# Patient Record
Sex: MALE | Race: WHITE | HISPANIC OR LATINO | URBAN - METROPOLITAN AREA
[De-identification: names, ages, dates, MRNs, and addresses within clinical notes are randomized per-mention and may not be internally consistent; named-entity substitution may affect disease eponyms.]

---

## 2020-02-03 ENCOUNTER — EMERGENCY (EMERGENCY)
Facility: HOSPITAL | Age: 50
LOS: 1 days | Discharge: HOME | End: 2020-02-03
Admitting: EMERGENCY MEDICINE
Payer: COMMERCIAL

## 2020-02-03 VITALS
DIASTOLIC BLOOD PRESSURE: 81 MMHG | HEART RATE: 61 BPM | SYSTOLIC BLOOD PRESSURE: 122 MMHG | OXYGEN SATURATION: 98 % | RESPIRATION RATE: 18 BRPM | TEMPERATURE: 97 F

## 2020-02-03 VITALS
RESPIRATION RATE: 18 BRPM | SYSTOLIC BLOOD PRESSURE: 131 MMHG | OXYGEN SATURATION: 98 % | TEMPERATURE: 98 F | HEART RATE: 78 BPM | DIASTOLIC BLOOD PRESSURE: 79 MMHG | WEIGHT: 164.91 LBS | HEIGHT: 65 IN

## 2020-02-03 DIAGNOSIS — M54.5 LOW BACK PAIN: ICD-10-CM

## 2020-02-03 LAB
APPEARANCE UR: CLEAR — SIGNIFICANT CHANGE UP
BILIRUB UR-MCNC: NEGATIVE — SIGNIFICANT CHANGE UP
COLOR SPEC: YELLOW — SIGNIFICANT CHANGE UP
DIFF PNL FLD: NEGATIVE — SIGNIFICANT CHANGE UP
GLUCOSE UR QL: NEGATIVE — SIGNIFICANT CHANGE UP
KETONES UR-MCNC: NEGATIVE — SIGNIFICANT CHANGE UP
LEUKOCYTE ESTERASE UR-ACNC: NEGATIVE — SIGNIFICANT CHANGE UP
NITRITE UR-MCNC: NEGATIVE — SIGNIFICANT CHANGE UP
PH UR: 6 — SIGNIFICANT CHANGE UP (ref 5–8)
PROT UR-MCNC: NEGATIVE MG/DL — SIGNIFICANT CHANGE UP
SP GR SPEC: >=1.03 — SIGNIFICANT CHANGE UP (ref 1–1.03)
UROBILINOGEN FLD QL: 0.2 E.U./DL — SIGNIFICANT CHANGE UP

## 2020-02-03 PROCEDURE — 74176 CT ABD & PELVIS W/O CONTRAST: CPT | Mod: 26

## 2020-02-03 PROCEDURE — 72131 CT LUMBAR SPINE W/O DYE: CPT | Mod: 26

## 2020-02-03 PROCEDURE — 72131 CT LUMBAR SPINE W/O DYE: CPT

## 2020-02-03 PROCEDURE — 96372 THER/PROPH/DIAG INJ SC/IM: CPT

## 2020-02-03 PROCEDURE — 81003 URINALYSIS AUTO W/O SCOPE: CPT

## 2020-02-03 PROCEDURE — 99285 EMERGENCY DEPT VISIT HI MDM: CPT

## 2020-02-03 PROCEDURE — 74176 CT ABD & PELVIS W/O CONTRAST: CPT

## 2020-02-03 PROCEDURE — 99284 EMERGENCY DEPT VISIT MOD MDM: CPT | Mod: 25

## 2020-02-03 RX ORDER — TRAMADOL HYDROCHLORIDE 50 MG/1
50 TABLET ORAL ONCE
Refills: 0 | Status: DISCONTINUED | OUTPATIENT
Start: 2020-02-03 | End: 2020-02-03

## 2020-02-03 RX ORDER — METHOCARBAMOL 500 MG/1
2 TABLET, FILM COATED ORAL
Qty: 18 | Refills: 0
Start: 2020-02-03 | End: 2020-02-05

## 2020-02-03 RX ORDER — TRAMADOL HYDROCHLORIDE 50 MG/1
1 TABLET ORAL
Qty: 9 | Refills: 0
Start: 2020-02-03 | End: 2020-02-05

## 2020-02-03 RX ORDER — LIDOCAINE 4 G/100G
1 CREAM TOPICAL ONCE
Refills: 0 | Status: COMPLETED | OUTPATIENT
Start: 2020-02-03 | End: 2020-02-03

## 2020-02-03 RX ORDER — DIAZEPAM 5 MG
10 TABLET ORAL ONCE
Refills: 0 | Status: DISCONTINUED | OUTPATIENT
Start: 2020-02-03 | End: 2020-02-03

## 2020-02-03 RX ORDER — KETOROLAC TROMETHAMINE 30 MG/ML
60 SYRINGE (ML) INJECTION ONCE
Refills: 0 | Status: DISCONTINUED | OUTPATIENT
Start: 2020-02-03 | End: 2020-02-03

## 2020-02-03 RX ADMIN — Medication 60 MILLIGRAM(S): at 12:12

## 2020-02-03 RX ADMIN — Medication 60 MILLIGRAM(S): at 10:04

## 2020-02-03 RX ADMIN — LIDOCAINE 1 PATCH: 4 CREAM TOPICAL at 15:47

## 2020-02-03 RX ADMIN — TRAMADOL HYDROCHLORIDE 50 MILLIGRAM(S): 50 TABLET ORAL at 15:48

## 2020-02-03 RX ADMIN — Medication 10 MILLIGRAM(S): at 10:04

## 2020-02-03 NOTE — ED PROVIDER NOTE - CLINICAL SUMMARY MEDICAL DECISION MAKING FREE TEXT BOX
50 y/o M no PMHx p/w R lower back pain s/p pushing heavy cart. No N/T/W, incontinence. Pt ambulatory in ED. Tenderness to R upper lumbar paraspinal muscles. No midline tenderness. Pain reproducible w/ movements. Lower extremity strength intact. Sensation intact. Likely MSK pain. Will try Toradol and Valium. If pain persists, will refer to PMD for outpatient MRI and possible physical therapy.

## 2020-02-03 NOTE — ED ADULT NURSE NOTE - OBJECTIVE STATEMENT
pt is [employer] employee, c/o of lower back pain for two weeks after he hurt himself while working. pt denies other injury or pain, no radiation of the pain to the other part of his body. pt appears uncomfortable.

## 2020-02-03 NOTE — ED ADULT TRIAGE NOTE - OTHER COMPLAINTS
patient c/o R lower back pain x 2 weeks s/p pushing a 450 pound linen cart--denies urinary/bowel incontinence--ambulatory into triage

## 2020-02-03 NOTE — ED PROVIDER NOTE - PATIENT PORTAL LINK FT
You can access the FollowMyHealth Patient Portal offered by [facility] by registering at the following website: [email or web address]. By joining [employer]’s FollowMyHealth portal, you will also be able to view your health information using other applications (apps) compatible with our system.

## 2020-02-03 NOTE — ED ADULT NURSE NOTE - NSIMPLEMENTINTERV_GEN_ALL_ED
Implemented All Universal Safety Interventions:  [place] to call system. Call bell, personal items and telephone within reach. Instruct patient to call for assistance. Room bathroom lighting operational. Non-slip footwear when patient is off stretcher. Physically safe environment: no spills, clutter or unnecessary equipment. Stretcher in lowest position, wheels locked, appropriate side rails in place.

## 2020-02-03 NOTE — ED PROVIDER NOTE - PHYSICAL EXAMINATION
CONSTITUTIONAL: Well-appearing; well-nourished; in no apparent distress.   HEAD: Normocephalic; atraumatic.   EYES: PERRL; EOM intact; conjunctiva and sclera clear  ENT: normal nose; no rhinorrhea; normal pharynx with no erythema or lesions.   NECK: Supple; non-tender; no LAD  BACK: Tenderness to R upper lumbar paraspinal muscles. No midline tenderness. Pain reproducible w/ movements. Lower extremity strength intact. Sensation intact.   CARDIOVASCULAR: Normal S1, S2; no murmurs, rubs, or gallops. Regular rate and rhythm.   RESPIRATORY: Breathing easily; breath sounds clear and equal bilaterally; no wheezes, rhonchi, or rales.  GI: Soft; non-distended; non-tender; no palpable organomegaly.   MSK: FROM at all extremities, normal tone   EXT: No cyanosis or edema; N/V intact  SKIN: Normal for age and race; warm; dry; good turgor; no apparent lesions or rash.   NEURO: A & O x 3; face symmetric; grossly unremarkable.   PSYCHOLOGICAL: The patient’s mood and manner are appropriate.

## 2020-02-03 NOTE — ED PROVIDER NOTE - OBJECTIVE STATEMENT
48 y/o M [employer] employee no PMHx presents to the ED with c/o right lower back pain x 2 W s/p trying to avoid running into baby while pushing a 450lb linen truck. He initially felt a muscle pull that was not too painful, but it gradually worsened. He went to his PMD Dr. Grey 2 D after the initial incident and was given anti-inflammatories and a muscle relaxer w/ no relief. The pain is worsened w/ movement. He currently feels a slight pinch in his back that makes him "gasp for air." Denies leg/groin pain, leg N/T/W, bloody urination, abdominal pain and incontinence. Pt is able to ambulate. He feels some burning during urination. 50 y/o M [employer] employee no PMHx presents to the ED with c/o right lower back pain x 2 W s/p trying to avoid running into baby while pushing a 450lb linen truck. He initially felt a muscle pull that was not too painful, but it gradually worsened. He went to his PMD Dr. Marcelino 2 D after the initial incident and was given anti-inflammatories and a muscle relaxer w/ no relief. The pain is worsened w/ movement. He currently feels a slight pinch in his back that makes him "gasp for air." Denies leg/groin pain, leg N/T/W, bloody urination, abdominal pain and incontinence. Pt is able to ambulate. He feels some burning during urination.

## 2020-04-26 ENCOUNTER — MESSAGE (OUTPATIENT)
Age: 50
End: 2020-04-26

## 2020-05-07 LAB
SARS-COV-2 IGG SERPL IA-ACNC: 0 INDEX
SARS-COV-2 IGG SERPL QL IA: NEGATIVE

## 2020-08-28 ENCOUNTER — EMERGENCY (EMERGENCY)
Facility: HOSPITAL | Age: 50
LOS: 1 days | Discharge: HOME | End: 2020-08-28
Attending: EMERGENCY MEDICINE | Admitting: EMERGENCY MEDICINE
Payer: COMMERCIAL

## 2020-08-28 VITALS
OXYGEN SATURATION: 97 % | DIASTOLIC BLOOD PRESSURE: 82 MMHG | WEIGHT: 164.91 LBS | SYSTOLIC BLOOD PRESSURE: 141 MMHG | RESPIRATION RATE: 16 BRPM | HEART RATE: 90 BPM | TEMPERATURE: 99 F

## 2020-08-28 LAB — SARS-COV-2 RNA SPEC QL NAA+PROBE: SIGNIFICANT CHANGE UP

## 2020-08-28 PROCEDURE — 99283 EMERGENCY DEPT VISIT LOW MDM: CPT

## 2020-08-28 PROCEDURE — U0003: CPT

## 2020-08-28 NOTE — ED PROVIDER NOTE - ATTENDING CONTRIBUTION TO CARE
49 M works here requests covid test b/c coworker was +  vss  s1s2 lungs cta bl  abd soft nt nd +bs  no sx  IMP- COVID test
18

## 2020-08-28 NOTE — ED PROVIDER NOTE - PATIENT PORTAL LINK FT
You can access the FollowMyHealth Patient Portal offered by [facility] by registering at the following website: [email or web address]. By joining [employer]’s FollowMyHealth portal, you will also be able to view your health information using other applications (apps) compatible with our system.

## 2020-08-28 NOTE — ED PROVIDER NOTE - CLINICAL SUMMARY MEDICAL DECISION MAKING FREE TEXT BOX
48 y/o m presents for covid test after a co-worker tested positive this past week who he had been around; pt asymptomatic, vss, exam unremarkable, test performed, will d/c, f/u result.

## 2020-08-28 NOTE — ED PROVIDER NOTE - OBJECTIVE STATEMENT
50 y/o m employee at [employer] presents stating a co worker recently tested positive for covid and has been sent home to quarantine.  Pt reports working closely with this person and wants to be tested.  Pt has no sx.  Denies fever, chills, cough, sob, all other ROS negative.

## 2020-08-31 ENCOUNTER — TRANSCRIPTION ENCOUNTER (OUTPATIENT)
Age: 50
End: 2020-08-31

## 2020-09-01 ENCOUNTER — TRANSCRIPTION ENCOUNTER (OUTPATIENT)
Age: 50
End: 2020-09-01

## 2020-09-01 DIAGNOSIS — Z20.828 CONTACT WITH AND (SUSPECTED) EXPOSURE TO OTHER VIRAL COMMUNICABLE DISEASES: ICD-10-CM

## 2020-09-02 ENCOUNTER — TRANSCRIPTION ENCOUNTER (OUTPATIENT)
Age: 50
End: 2020-09-02

## 2020-11-20 ENCOUNTER — EMERGENCY (EMERGENCY)
Facility: HOSPITAL | Age: 50
LOS: 1 days | Discharge: HOME | End: 2020-11-20
Attending: EMERGENCY MEDICINE | Admitting: EMERGENCY MEDICINE
Payer: COMMERCIAL

## 2020-11-20 VITALS
WEIGHT: 164.91 LBS | SYSTOLIC BLOOD PRESSURE: 117 MMHG | TEMPERATURE: 98 F | HEART RATE: 90 BPM | RESPIRATION RATE: 16 BRPM | DIASTOLIC BLOOD PRESSURE: 73 MMHG | HEIGHT: 65 IN | OXYGEN SATURATION: 97 %

## 2020-11-20 VITALS
TEMPERATURE: 99 F | RESPIRATION RATE: 17 BRPM | OXYGEN SATURATION: 99 % | HEART RATE: 65 BPM | SYSTOLIC BLOOD PRESSURE: 108 MMHG | DIASTOLIC BLOOD PRESSURE: 67 MMHG

## 2020-11-20 DIAGNOSIS — R21 RASH AND OTHER NONSPECIFIC SKIN ERUPTION: ICD-10-CM

## 2020-11-20 DIAGNOSIS — T78.40XA ALLERGY, UNSPECIFIED, INITIAL ENCOUNTER: ICD-10-CM

## 2020-11-20 DIAGNOSIS — Z20.828 CONTACT WITH AND (SUSPECTED) EXPOSURE TO OTHER VIRAL COMMUNICABLE DISEASES: ICD-10-CM

## 2020-11-20 DIAGNOSIS — Z88.8 ALLERGY STATUS TO OTHER DRUGS, MEDICAMENTS AND BIOLOGICAL SUBSTANCES: ICD-10-CM

## 2020-11-20 LAB
ALBUMIN SERPL ELPH-MCNC: 4.2 G/DL — SIGNIFICANT CHANGE UP (ref 3.3–5)
ALP SERPL-CCNC: 79 U/L — SIGNIFICANT CHANGE UP (ref 40–120)
ALT FLD-CCNC: 25 U/L — SIGNIFICANT CHANGE UP (ref 10–45)
ANION GAP SERPL CALC-SCNC: 12 MMOL/L — SIGNIFICANT CHANGE UP (ref 5–17)
AST SERPL-CCNC: 22 U/L — SIGNIFICANT CHANGE UP (ref 10–40)
BASOPHILS # BLD AUTO: 0.02 K/UL — SIGNIFICANT CHANGE UP (ref 0–0.2)
BASOPHILS NFR BLD AUTO: 0.2 % — SIGNIFICANT CHANGE UP (ref 0–2)
BILIRUB SERPL-MCNC: 0.4 MG/DL — SIGNIFICANT CHANGE UP (ref 0.2–1.2)
BUN SERPL-MCNC: 19 MG/DL — SIGNIFICANT CHANGE UP (ref 7–23)
CALCIUM SERPL-MCNC: 9.5 MG/DL — SIGNIFICANT CHANGE UP (ref 8.4–10.5)
CHLORIDE SERPL-SCNC: 103 MMOL/L — SIGNIFICANT CHANGE UP (ref 96–108)
CO2 SERPL-SCNC: 21 MMOL/L — LOW (ref 22–31)
CREAT SERPL-MCNC: 0.9 MG/DL — SIGNIFICANT CHANGE UP (ref 0.5–1.3)
EOSINOPHIL # BLD AUTO: 0.03 K/UL — SIGNIFICANT CHANGE UP (ref 0–0.5)
EOSINOPHIL NFR BLD AUTO: 0.3 % — SIGNIFICANT CHANGE UP (ref 0–6)
GLUCOSE SERPL-MCNC: 99 MG/DL — SIGNIFICANT CHANGE UP (ref 70–99)
HCT VFR BLD CALC: 49.8 % — SIGNIFICANT CHANGE UP (ref 39–50)
HGB BLD-MCNC: 16.5 G/DL — SIGNIFICANT CHANGE UP (ref 13–17)
IMM GRANULOCYTES NFR BLD AUTO: 0.4 % — SIGNIFICANT CHANGE UP (ref 0–1.5)
LYMPHOCYTES # BLD AUTO: 1.83 K/UL — SIGNIFICANT CHANGE UP (ref 1–3.3)
LYMPHOCYTES # BLD AUTO: 17.9 % — SIGNIFICANT CHANGE UP (ref 13–44)
MCHC RBC-ENTMCNC: 29.7 PG — SIGNIFICANT CHANGE UP (ref 27–34)
MCHC RBC-ENTMCNC: 33.1 GM/DL — SIGNIFICANT CHANGE UP (ref 32–36)
MCV RBC AUTO: 89.7 FL — SIGNIFICANT CHANGE UP (ref 80–100)
MONOCYTES # BLD AUTO: 0.33 K/UL — SIGNIFICANT CHANGE UP (ref 0–0.9)
MONOCYTES NFR BLD AUTO: 3.2 % — SIGNIFICANT CHANGE UP (ref 2–14)
NEUTROPHILS # BLD AUTO: 8 K/UL — HIGH (ref 1.8–7.4)
NEUTROPHILS NFR BLD AUTO: 78 % — HIGH (ref 43–77)
NRBC # BLD: 0 /100 WBCS — SIGNIFICANT CHANGE UP (ref 0–0)
PLATELET # BLD AUTO: 237 K/UL — SIGNIFICANT CHANGE UP (ref 150–400)
POTASSIUM SERPL-MCNC: 4.4 MMOL/L — SIGNIFICANT CHANGE UP (ref 3.5–5.3)
POTASSIUM SERPL-SCNC: 4.4 MMOL/L — SIGNIFICANT CHANGE UP (ref 3.5–5.3)
PROT SERPL-MCNC: 7.3 G/DL — SIGNIFICANT CHANGE UP (ref 6–8.3)
RBC # BLD: 5.55 M/UL — SIGNIFICANT CHANGE UP (ref 4.2–5.8)
RBC # FLD: 13.3 % — SIGNIFICANT CHANGE UP (ref 10.3–14.5)
SARS-COV-2 RNA SPEC QL NAA+PROBE: SIGNIFICANT CHANGE UP
SODIUM SERPL-SCNC: 136 MMOL/L — SIGNIFICANT CHANGE UP (ref 135–145)
WBC # BLD: 10.25 K/UL — SIGNIFICANT CHANGE UP (ref 3.8–10.5)
WBC # FLD AUTO: 10.25 K/UL — SIGNIFICANT CHANGE UP (ref 3.8–10.5)

## 2020-11-20 PROCEDURE — U0003: CPT

## 2020-11-20 PROCEDURE — 36415 COLL VENOUS BLD VENIPUNCTURE: CPT

## 2020-11-20 PROCEDURE — 96374 THER/PROPH/DIAG INJ IV PUSH: CPT

## 2020-11-20 PROCEDURE — 99284 EMERGENCY DEPT VISIT MOD MDM: CPT | Mod: 25

## 2020-11-20 PROCEDURE — 96375 TX/PRO/DX INJ NEW DRUG ADDON: CPT

## 2020-11-20 PROCEDURE — 80053 COMPREHEN METABOLIC PANEL: CPT

## 2020-11-20 PROCEDURE — 96361 HYDRATE IV INFUSION ADD-ON: CPT

## 2020-11-20 PROCEDURE — 99284 EMERGENCY DEPT VISIT MOD MDM: CPT

## 2020-11-20 PROCEDURE — 85025 COMPLETE CBC W/AUTO DIFF WBC: CPT

## 2020-11-20 RX ORDER — EPINEPHRINE 0.3 MG/.3ML
0.3 INJECTION INTRAMUSCULAR; SUBCUTANEOUS
Qty: 0.3 | Refills: 2
Start: 2020-11-20

## 2020-11-20 RX ORDER — DIPHENHYDRAMINE HCL 50 MG
50 CAPSULE ORAL ONCE
Refills: 0 | Status: COMPLETED | OUTPATIENT
Start: 2020-11-20 | End: 2020-11-20

## 2020-11-20 RX ORDER — SODIUM CHLORIDE 9 MG/ML
1000 INJECTION INTRAMUSCULAR; INTRAVENOUS; SUBCUTANEOUS ONCE
Refills: 0 | Status: COMPLETED | OUTPATIENT
Start: 2020-11-20 | End: 2020-11-20

## 2020-11-20 RX ORDER — DIPHENHYDRAMINE HCL 50 MG
2 CAPSULE ORAL
Qty: 20 | Refills: 0
Start: 2020-11-20

## 2020-11-20 RX ORDER — FAMOTIDINE 10 MG/ML
20 INJECTION INTRAVENOUS ONCE
Refills: 0 | Status: COMPLETED | OUTPATIENT
Start: 2020-11-20 | End: 2020-11-20

## 2020-11-20 RX ADMIN — Medication 50 MILLIGRAM(S): at 09:17

## 2020-11-20 RX ADMIN — Medication 125 MILLIGRAM(S): at 09:23

## 2020-11-20 RX ADMIN — SODIUM CHLORIDE 1000 MILLILITER(S): 9 INJECTION INTRAMUSCULAR; INTRAVENOUS; SUBCUTANEOUS at 10:34

## 2020-11-20 RX ADMIN — FAMOTIDINE 20 MILLIGRAM(S): 10 INJECTION INTRAVENOUS at 09:23

## 2020-11-20 RX ADMIN — SODIUM CHLORIDE 1000 MILLILITER(S): 9 INJECTION INTRAMUSCULAR; INTRAVENOUS; SUBCUTANEOUS at 09:22

## 2020-11-20 NOTE — ED PROVIDER NOTE - ATTENDING CONTRIBUTION TO CARE
48 yo M with PMH of anaphylaxis after taking Aleve and moxifloxacin p/w burning and itching rash since last night. Pt believes rash began after trying coworkers jerk chicken. Rash started in ears and face then spread to chest, shoulder and thighs overnight. Pt tried applying Benadryl cream with no relief. Currently endorses mild SOB, and nausea. No fevers, vomiting, CP, or diarrhea. Pt does not feel like tongue or throat is swollen and has no trouble breathing. Pt AAO, NAD, erythematous rash noted to torso/ arms and legs. No angioedema or intra-oral/ facial swelling noted. Ordered IVF benadryl, solumedrol and pepcid. Labs WNL. Pt with marked improvement of sxs post meds. Dcd with Rx for Prednisone and epi pen and advised to take Benadryl around the clock for the next 24 to 48 hours. Return precautions given.

## 2020-11-20 NOTE — ED ADULT TRIAGE NOTE - CHIEF COMPLAINT QUOTE
Pt CO generalized rash since yesterday.  Pt states "I had some pork teriyaki yesterday but I can't think of anything else that is setting my allergies off."  Speaking clearly, no labored breathing or facial swelling noted.

## 2020-11-20 NOTE — ED PROVIDER NOTE - PATIENT PORTAL LINK FT
You can access the FollowMyHealth Patient Portal offered by [facility] by registering at the following website: [email or web address]. By joining [employer]’s FollowMyHealth portal, you will also be able to view your health information using other applications (apps) compatible with our system.

## 2020-11-20 NOTE — ED PROVIDER NOTE - NSFOLLOWUPCLINICS_GEN_ALL_ED_FT
[facility]  Family Medicine  [address]  [place], [place] [address]  Phone: [phone]  Fax:   Follow Up Time: 4-6 Days

## 2020-11-20 NOTE — ED PROVIDER NOTE - ENMT NEGATIVE STATEMENT, MLM
Ears: no ear pain and no hearing problems. Nose: no nasal congestion and no nasal drainage. Mouth/Throat: no dysphagia, no hoarseness and no throat pain. N

## 2020-11-20 NOTE — ED ADULT NURSE NOTE - CHPI ED NUR SYMPTOMS NEG
no fever/no nausea/no tingling/no chills/no dizziness/no pain/no vomiting/no weakness/no decreased eating/drinking

## 2020-11-20 NOTE — ED PROVIDER NOTE - SKIN COLOR
Blanching erythematous and warm rash of face, ears, chest, shoulders and thighs with excoriation present. No wheals or hives

## 2020-11-20 NOTE — ED PROVIDER NOTE - CLINICAL SUMMARY MEDICAL DECISION MAKING FREE TEXT BOX
Pt is a 48 yo M with PMH of anaphylaxis after taking alleve and moxifloxacin (unsure if intubated) who p/w burning and itching rash due to unknown allergic reaction last night. Pt endorses mild SOB but no throat or tongue swelling. Mild GI symptoms of nausea, no vomiting or diarrhea. Pt likely not anaphylactic at this time. Will obtain labs. Ordered benadryl, solumedrol and pepcid.    Plan:  -IV fluids  -benadryl, solumedrol, pepcid  -f/u labs Pt is a 50 yo M with PMH of anaphylaxis after taking alleve and moxifloxacin (unsure if intubated) who p/w burning and itching rash due to unknown allergic reaction last night. Pt endorses mild SOB but no throat or tongue swelling. Mild GI symptoms of nausea, no vomiting or diarrhea. Pt likely not anaphylactic at this time. Will obtain labs. Ordered benadryl, solumedrol and pepcid.    Interval Progress Note:  Rash much improved after benadryl, solumedrol and pepcid. Pt experienced drowsiness with benadryl that is decreasing with rest. Itching has improved and now has mild itching. Pt stable and feels ready for discharged. Will be discharged with benadryl, prednisone and epi pen    Plan:  -IV fluids  -benadryl, solumedrol, pepcid  -f/u labs Pt is a 48 yo M with PMH of anaphylaxis after taking alleve and moxifloxacin (unsure if intubated) who p/w burning and itching rash due to unknown allergic reaction last night. Pt endorses mild SOB but no throat or tongue swelling. Mild GI symptoms of nausea, no vomiting or diarrhea. Pt likely not anaphylactic at this time. Will obtain labs. Ordered benadryl, solumedrol and pepcid.  Plan:  -IV fluids  -benadryl, solumedrol, pepcid  -f/u labs    Interval Progress Note:  Rash much improved after benadryl, solumedrol and pepcid. Pt experienced drowsiness with benadryl that is decreasing with rest. Itching has improved and now has mild itching. Pt stable and feels ready for discharged. Will be discharged with benadryl, prednisone and epi pen

## 2020-11-20 NOTE — ED PROVIDER NOTE - PSYCHIATRIC, MLM
"Chief Complaint   Patient presents with     Urgent Care     Cough     cough x 1 week, sore throat     Otalgia     ear pain x 1 day with crusty eyes with sinus pressure tx:otc       Initial /60 (BP Location: Right arm, Patient Position: Chair, Cuff Size: Adult Regular)  Pulse 99  Temp 98.5  F (36.9  C) (Oral)  Wt 148 lb 14.4 oz (67.5 kg)  SpO2 98%  BMI 26.38 kg/m2 Estimated body mass index is 26.38 kg/(m^2) as calculated from the following:    Height as of 2/16/15: 5' 3\" (1.6 m).    Weight as of this encounter: 148 lb 14.4 oz (67.5 kg).  Medication Reconciliation: unable or not appropriate to perform   Aranza Bennett Medical Assistant      "
Alert and oriented to person, place, time/situation. normal mood and affect. no apparent risk to self or others.

## 2020-11-20 NOTE — ED PROVIDER NOTE - NSFOLLOWUPINSTRUCTIONS_ED_ALL_ED_FT
You were tested for Covid 19 per your request and you will get a call in 24-48 hours with the results.   Please take your medications as prescribed and return to the ER if you develop any concerning symptoms.     Allergic Reaction    An allergic reaction is an abnormal reaction to a substance (allergen) by the body's defense system. Common allergens include medicines, food, insect bites or stings, and blood products. The body releases certain proteins into the blood that can cause a variety of symptoms such as an itchy rash, wheezing, swelling of the face/lips/tongue/throat, abdominal pain, nausea or vomiting. An allergic reaction is usually treated with medication. If your health care provider prescribed you an epinephrine injection device, make sure to keep it with you at all times.    SEEK IMMEDIATE MEDICAL CARE IF YOU HAVE ANY OF THE FOLLOWING SYMPTOMS: allergic reaction severe enough that required you to use epinephrine, tightness in your chest, swelling around your lips/tongue/throat, abdominal pain, vomiting or diarrhea, or lightheadedness/dizziness. These symptoms may represent a serious problem that is an emergency. Do not wait to see if the symptoms will go away. Use your auto-injector pen or anaphylaxis kit as you have been instructed. Call 911 and do not drive yourself to the hospital.

## 2020-11-20 NOTE — ED ADULT NURSE NOTE - OBJECTIVE STATEMENT
Patient states "I noticed this rash all over my body and I itch all over after eating pork teriyaki that my coworker made yesterday. I put benadryl cream and it helped a little"  Rash noted to bilateral arms, chest that started yesterday.  Denies throat discomfort, chest tightness, sob.  Patient also verbalized request to have covid pcr because he is visiting his mother next week.  Patient speaking clearly and coherently in full sentences.

## 2020-11-20 NOTE — ED PROVIDER NOTE - ENMT, MLM
No angioedema or swelling of tongue, Airway patent, Nasal mucosa clear. Mouth with normal mucosa. Throat has no vesicles, no oropharyngeal exudates and uvula is midline.

## 2020-11-20 NOTE — ED PROVIDER NOTE - OBJECTIVE STATEMENT
Pt is a 50 yo M with PMH of anaphylaxis after taking alleve and moxifloxacin (unsure if intubated) who p/w burning and itching rash since last night. Pt believes rash began after trying coworkers jerk chicken. Rash started in ears and face then spread to chest, shoulder and thighs overnight. Pt tried applying benadryl cream with no relief. Currently endorses mild SOB, and nausea. He had a 15 minute episode of chills when waking up at 3AM. No fever, vomiting, CP, or diarrhea. Pt does not feel like tongue or throat is swollen and has no trouble breathing. Other than possible allergy to alleve or moxifloxacin, the pt has no known allergies. Pt is a 48 yo M with PMH of anaphylaxis after taking aleve and moxifloxacin p/w burning and itching rash since last night. Pt believes rash began after trying coworkers jerk chicken. Rash started in ears and face then spread to chest, shoulder and thighs overnight. Pt tried applying benadryl cream with no relief. Currently endorses mild SOB, and nausea. He had a 15 minute episode of chills when waking up at 3AM. No fever, vomiting, CP, or diarrhea. Pt does not feel like tongue or throat is swollen and has no trouble breathing. Other than possible allergy to alleve or moxifloxacin, the pt has no known allergies.

## 2020-11-20 NOTE — ED PROVIDER NOTE - PROGRESS NOTE DETAILS
Rash nearly resolved. Itching and burning decreasing. Pt experienced drowsiness after benadryl that is decreasing over time

## 2020-11-22 ENCOUNTER — EMERGENCY (EMERGENCY)
Facility: HOSPITAL | Age: 50
LOS: 1 days | Discharge: HOME | End: 2020-11-22
Attending: EMERGENCY MEDICINE | Admitting: EMERGENCY MEDICINE
Payer: COMMERCIAL

## 2020-11-22 VITALS
TEMPERATURE: 98 F | OXYGEN SATURATION: 96 % | DIASTOLIC BLOOD PRESSURE: 78 MMHG | SYSTOLIC BLOOD PRESSURE: 115 MMHG | HEART RATE: 69 BPM | RESPIRATION RATE: 18 BRPM

## 2020-11-22 VITALS
RESPIRATION RATE: 16 BRPM | TEMPERATURE: 98 F | WEIGHT: 164.91 LBS | SYSTOLIC BLOOD PRESSURE: 134 MMHG | DIASTOLIC BLOOD PRESSURE: 75 MMHG | OXYGEN SATURATION: 97 % | HEIGHT: 65 IN | HEART RATE: 90 BPM

## 2020-11-22 PROCEDURE — 96375 TX/PRO/DX INJ NEW DRUG ADDON: CPT

## 2020-11-22 PROCEDURE — 99284 EMERGENCY DEPT VISIT MOD MDM: CPT

## 2020-11-22 PROCEDURE — 96374 THER/PROPH/DIAG INJ IV PUSH: CPT

## 2020-11-22 PROCEDURE — 99284 EMERGENCY DEPT VISIT MOD MDM: CPT | Mod: 25

## 2020-11-22 RX ORDER — DIPHENHYDRAMINE HCL 50 MG
50 CAPSULE ORAL ONCE
Refills: 0 | Status: COMPLETED | OUTPATIENT
Start: 2020-11-22 | End: 2020-11-22

## 2020-11-22 RX ORDER — FAMOTIDINE 10 MG/ML
20 INJECTION INTRAVENOUS ONCE
Refills: 0 | Status: COMPLETED | OUTPATIENT
Start: 2020-11-22 | End: 2020-11-22

## 2020-11-22 RX ADMIN — Medication 50 MILLIGRAM(S): at 18:26

## 2020-11-22 RX ADMIN — FAMOTIDINE 20 MILLIGRAM(S): 10 INJECTION INTRAVENOUS at 18:27

## 2020-11-22 RX ADMIN — Medication 40 MILLIGRAM(S): at 18:27

## 2020-11-22 NOTE — ED ADULT TRIAGE NOTE - ARRIVAL INFO ADDITIONAL COMMENTS
pt states he was in this ED 2 days ago for allergic reaction.  pt currently taking prednisone and benadryl.   pt reports onset up upper lip swelling 30 minutes ago. denies SOB pt states he was in this ED 2 days ago for allergic reaction.  pt currently taking prednisone and benadryl.   pt reports onset of upper lip swelling 30 minutes ago. denies SOB

## 2020-11-22 NOTE — ED PROVIDER NOTE - CLINICAL SUMMARY MEDICAL DECISION MAKING FREE TEXT BOX
angioedema with swelling of upper lip, posterior pharynx and lungs wnl. vitals stable. given benadryl/solumedrol/pepcid. already on benadryl/prednisone but not taking as directed. counseled on appropriate dosing. observed x 2 hrs with symptoms stable/improving. home with allergy referral, continued meds. return precautions discussed angioedema with swelling of upper lip, posterior pharynx and lungs wnl. vitals stable. given benadryl/solumedrol/pepcid. already on benadryl/prednisone but not taking as directed. counseled on appropriate dosing. observed x 2 hrs with symptoms stable/improving. home with allergy referral, continued meds. return precautions discussed. advised to avoid pork as may be trigger.

## 2020-11-22 NOTE — ED PROVIDER NOTE - NSFOLLOWUPINSTRUCTIONS_ED_ALL_ED_FT
Please see your primary care provider in 2-3 days.  Call for appointment.  If you have any problems with followup, please call the ED Referral Coordinator at [phone].  Return to the ER if symptoms worsen or other concerns.      Angioedema    WHAT YOU NEED TO KNOW:    Angioedema is sudden swelling caused by fluid that collects in deep layers of the skin. Swelling occurs most often on the face, lips, tongue, or throat, but it can happen anywhere on the body. Your risk for angioedema increases if you have food or insect allergies or a family history of angioedema. Emotional stress, autoimmune disorders, and medicines, such as ACE inhibitors, also increase your risk. Your symptoms may be mild, or you may develop anaphylaxis. Anaphylaxis is a sudden, life-threatening reaction that needs immediate treatment.     DISCHARGE INSTRUCTIONS:    Call 911 for signs or symptoms of anaphylaxis, such as trouble breathing, swelling in your mouth or throat, or wheezing. You may also have itching, a rash, hives, or feel like you are going to faint.    Return to the emergency department if:   •You have sudden behavior changes or irritability.       •You are dizzy and your heart is beating faster than usual.       Contact your healthcare provider if:   •Your swelling does not improve, even after you take your medicines.       •You have questions or concerns about your condition or care.       Medicines:   •Antihistamines decrease mild symptoms such as itching or a rash.       •Epinephrine is used to treat severe allergic reactions such as anaphylaxis.       •Steroids: This medicine may be given to decrease redness, pain, and swelling.      •Take your medicine as directed. Contact your healthcare provider if you think your medicine is not helping or if you have side effects. Tell him of her if you are allergic to any medicine. Keep a list of the medicines, vitamins, and herbs you take. Include the amounts, and when and why you take them. Bring the list or the pill bottles to follow-up visits. Carry your medicine list with you in case of an emergency.      Steps to take for signs or symptoms of anaphylaxis:   •Immediately give 1 shot of epinephrine only into the outer thigh muscle.       •Leave the shot in place as directed. Your healthcare provider may recommend you leave it in place for up to 10 seconds before you remove it. This helps make sure all of the epinephrine is delivered.       •Call 911 and go to the emergency department, even if the shot improved symptoms. Do not drive yourself. Bring the used epinephrine shot with you.       Safety precautions to take if you are at risk for anaphylaxis:   •Keep 2 shots of epinephrine with you at all times. You may need a second shot, because epinephrine only works for about 20 minutes and symptoms may return. Your healthcare provider can show you and family members how to give the shot. Check the expiration date every month and replace it before it expires.      •Create an action plan. Your healthcare provider can help you create a written plan that explains the allergy and an emergency plan to treat a reaction. The plan explains when to give a second epinephrine shot if symptoms return or do not improve after the first. Give copies of the action plan and emergency instructions to family members, work and school staff, and  providers. Show them how to give a shot of epinephrine.      •Be careful when you exercise. If you have had exercise-induced anaphylaxis, do not exercise right after you eat. Stop exercising right away if you start to develop any signs or symptoms of anaphylaxis. You may first feel tired, warm, or have itchy skin. Hives, swelling, and severe breathing problems may develop if you continue to exercise.      •Carry medical alert identification. Wear medical alert jewelry or carry a card that explains the allergy. Ask your healthcare provider where to get these items.   Medical Alert Jewelry           •Keep a symptom diary. Include information about how often your symptoms occur, how long they last, and if they are mild or severe. Also keep information on what you ate, what happened, or which medicines you took before the swelling started.       •Avoid triggers. Triggers include foods, medicines, and other things that you know cause symptoms. You may need to see a specialist, such as an allergist or dietitian, to learn what to avoid.      Follow up with your healthcare provider as directed: Write down your questions so you remember to ask them during your visits.         General Allergic Reaction    WHAT YOU NEED TO KNOW:    An allergic reaction is your body's response to an allergen. Allergens include medicines, food, insect stings, animal dander, mold, latex, chemicals, and dust mites. Pollen from trees, grass, and weeds can also cause an allergic reaction. An allergic reaction can range from mild to severe.    DISCHARGE INSTRUCTIONS:    Call 911 for signs or symptoms of anaphylaxis, such as trouble breathing, swelling in your mouth or throat, or wheezing. You may also have itching, a rash, hives, or feel like you are going to faint.    Return to the emergency department if:     You have a skin rash, hives, swelling, or itching that is starting to get worse.      Your throat tightens, or your lips or tongue swell.      You have trouble swallowing or speaking.      You have worsening nausea, diarrhea, or abdominal cramps, or you are vomiting.      You have chest pain or tightness.    Contact your healthcare provider if:     You have questions or concerns about your condition or care.        Medicines: You may need any of the following:     Medicines may be given to relieve certain allergy symptoms such as itching, sneezing, and swelling. You may take them as a pill or use drops in your nose or eyes. Topical treatments may be given to put directly on your skin to help decrease itching or swelling.      Epinephrine may be prescribed if you are at risk for anaphylaxis. This is a severe allergic reaction that can be life-threatening. Your healthcare provider will tell you if you need to keep epinephrine with you. You will be taught when and how to use it.      Take your medicine as directed. Contact your healthcare provider if you think your medicine is not helping or if you have side effects. Tell him of her if you are allergic to any medicine. Keep a list of the medicines, vitamins, and herbs you take. Include the amounts, and when and why you take them. Bring the list or the pill bottles to follow-up visits. Carry your medicine list with you in case of an emergency.    Follow up with your healthcare provider as directed: Write down your questions so you remember to ask them during your visits.     Manage your symptoms:     Avoid allergens. You may need to have allergy testing with your healthcare provider or a specialist to find your allergens.      Use cold compresses on your skin or eyes. This will help soothe skin or eyes affected by the allergic reaction. You can make a cold compress by soaking a washcloth in cool water. Wring out the extra water before you apply the washcloth.      Rinse your nasal passages with a saline solution. Daily rinsing may help clear allergens out of your nose. Use distilled water if possible. You can also boil tap water and then let it cool before you use it. Do not use tap water without boiling it first.      Do not smoke. Nicotine and other chemicals in cigarettes and cigars can make an allergic reaction worse, and can also cause lung damage. Ask your healthcare provider for information if you currently smoke and need help to quit. E-cigarettes or smokeless tobacco still contain nicotine. Talk to your healthcare provider before you use these products. Take benadryl 50mg every 6-8 hour for next 3 days and prednisone as previously prescribed (60mg daily for next 2 days).  Do not eat pork in future until cleared by allergy specialist. The care coordinator should contact you about followup with allergy specialist. Please see your primary care provider for followup.  Call for appointment.  If you have any problems with followup, please call the ED Referral Coordinator at [phone].  Return to the ER if symptoms worsen or other concerns.      Angioedema    WHAT YOU NEED TO KNOW:    Angioedema is sudden swelling caused by fluid that collects in deep layers of the skin. Swelling occurs most often on the face, lips, tongue, or throat, but it can happen anywhere on the body. Your risk for angioedema increases if you have food or insect allergies or a family history of angioedema. Emotional stress, autoimmune disorders, and medicines, such as ACE inhibitors, also increase your risk. Your symptoms may be mild, or you may develop anaphylaxis. Anaphylaxis is a sudden, life-threatening reaction that needs immediate treatment.     DISCHARGE INSTRUCTIONS:    Call 911 for signs or symptoms of anaphylaxis, such as trouble breathing, swelling in your mouth or throat, or wheezing. You may also have itching, a rash, hives, or feel like you are going to faint.    Return to the emergency department if:   •You have sudden behavior changes or irritability.       •You are dizzy and your heart is beating faster than usual.       Contact your healthcare provider if:   •Your swelling does not improve, even after you take your medicines.       •You have questions or concerns about your condition or care.       Medicines:   •Antihistamines decrease mild symptoms such as itching or a rash.       •Epinephrine is used to treat severe allergic reactions such as anaphylaxis.       •Steroids: This medicine may be given to decrease redness, pain, and swelling.      •Take your medicine as directed. Contact your healthcare provider if you think your medicine is not helping or if you have side effects. Tell him of her if you are allergic to any medicine. Keep a list of the medicines, vitamins, and herbs you take. Include the amounts, and when and why you take them. Bring the list or the pill bottles to follow-up visits. Carry your medicine list with you in case of an emergency.      Steps to take for signs or symptoms of anaphylaxis:   •Immediately give 1 shot of epinephrine only into the outer thigh muscle.       •Leave the shot in place as directed. Your healthcare provider may recommend you leave it in place for up to 10 seconds before you remove it. This helps make sure all of the epinephrine is delivered.       •Call 911 and go to the emergency department, even if the shot improved symptoms. Do not drive yourself. Bring the used epinephrine shot with you.       Safety precautions to take if you are at risk for anaphylaxis:   •Keep 2 shots of epinephrine with you at all times. You may need a second shot, because epinephrine only works for about 20 minutes and symptoms may return. Your healthcare provider can show you and family members how to give the shot. Check the expiration date every month and replace it before it expires.      •Create an action plan. Your healthcare provider can help you create a written plan that explains the allergy and an emergency plan to treat a reaction. The plan explains when to give a second epinephrine shot if symptoms return or do not improve after the first. Give copies of the action plan and emergency instructions to family members, work and school staff, and  providers. Show them how to give a shot of epinephrine.      •Be careful when you exercise. If you have had exercise-induced anaphylaxis, do not exercise right after you eat. Stop exercising right away if you start to develop any signs or symptoms of anaphylaxis. You may first feel tired, warm, or have itchy skin. Hives, swelling, and severe breathing problems may develop if you continue to exercise.      •Carry medical alert identification. Wear medical alert jewelry or carry a card that explains the allergy. Ask your healthcare provider where to get these items.   Medical Alert Jewelry           •Keep a symptom diary. Include information about how often your symptoms occur, how long they last, and if they are mild or severe. Also keep information on what you ate, what happened, or which medicines you took before the swelling started.       •Avoid triggers. Triggers include foods, medicines, and other things that you know cause symptoms. You may need to see a specialist, such as an allergist or dietitian, to learn what to avoid.      Follow up with your healthcare provider as directed: Write down your questions so you remember to ask them during your visits.         General Allergic Reaction    WHAT YOU NEED TO KNOW:    An allergic reaction is your body's response to an allergen. Allergens include medicines, food, insect stings, animal dander, mold, latex, chemicals, and dust mites. Pollen from trees, grass, and weeds can also cause an allergic reaction. An allergic reaction can range from mild to severe.    DISCHARGE INSTRUCTIONS:    Call 911 for signs or symptoms of anaphylaxis, such as trouble breathing, swelling in your mouth or throat, or wheezing. You may also have itching, a rash, hives, or feel like you are going to faint.    Return to the emergency department if:     You have a skin rash, hives, swelling, or itching that is starting to get worse.      Your throat tightens, or your lips or tongue swell.      You have trouble swallowing or speaking.      You have worsening nausea, diarrhea, or abdominal cramps, or you are vomiting.      You have chest pain or tightness.    Contact your healthcare provider if:     You have questions or concerns about your condition or care.        Medicines: You may need any of the following:     Medicines may be given to relieve certain allergy symptoms such as itching, sneezing, and swelling. You may take them as a pill or use drops in your nose or eyes. Topical treatments may be given to put directly on your skin to help decrease itching or swelling.      Epinephrine may be prescribed if you are at risk for anaphylaxis. This is a severe allergic reaction that can be life-threatening. Your healthcare provider will tell you if you need to keep epinephrine with you. You will be taught when and how to use it.      Take your medicine as directed. Contact your healthcare provider if you think your medicine is not helping or if you have side effects. Tell him of her if you are allergic to any medicine. Keep a list of the medicines, vitamins, and herbs you take. Include the amounts, and when and why you take them. Bring the list or the pill bottles to follow-up visits. Carry your medicine list with you in case of an emergency.    Follow up with your healthcare provider as directed: Write down your questions so you remember to ask them during your visits.     Manage your symptoms:     Avoid allergens. You may need to have allergy testing with your healthcare provider or a specialist to find your allergens.      Use cold compresses on your skin or eyes. This will help soothe skin or eyes affected by the allergic reaction. You can make a cold compress by soaking a washcloth in cool water. Wring out the extra water before you apply the washcloth.      Rinse your nasal passages with a saline solution. Daily rinsing may help clear allergens out of your nose. Use distilled water if possible. You can also boil tap water and then let it cool before you use it. Do not use tap water without boiling it first.      Do not smoke. Nicotine and other chemicals in cigarettes and cigars can make an allergic reaction worse, and can also cause lung damage. Ask your healthcare provider for information if you currently smoke and need help to quit. E-cigarettes or smokeless tobacco still contain nicotine. Talk to your healthcare provider before you use these products.

## 2020-11-22 NOTE — ED PROVIDER NOTE - OBJECTIVE STATEMENT
seen 2 days ago for allergic reaction with itching/hives to body/trunk. Returns today with persistent itching/hives and new swelling of upper lip that started earlier today. No trouble breathing/swallowing. Says he has been taking one benadryl in morning, on in the afternoon, and one prednisone in the morning and one in the afternoon (dc papers say to take 50mg tid and 60mg daily). Denies fever, chills, new medicines, new exposures. Thinks it may have started after eating jerk pork. seen 2 days ago for allergic reaction with itching/hives to body/trunk. Returns today with persistent itching/hives and new swelling of upper lip that started earlier today. No trouble breathing/swallowing. Says he has been taking one benadryl in morning, on in the afternoon, and one prednisone in the morning and one in the afternoon (dc papers say to take 50mg tid and 60mg daily). Denies fever, chills, new medicines, new exposures. Thinks it may have started after eating jerk pork.  Also had pork today

## 2020-11-22 NOTE — ED PROVIDER NOTE - PATIENT PORTAL LINK FT
You can access the FollowMyHealth Patient Portal offered by [facility] by registering at the following website: [email or web address]. By joining [facility]’s FollowMyHealth portal, you will also be able to view your health information using other applications (apps) compatible with our system.

## 2020-11-22 NOTE — ED ADULT NURSE NOTE - OBJECTIVE STATEMENT
Pt p/w swelling to upper lip x 1 hour prior to arrival. Denies any new interactions. Denies SOB, CP, throat swelling and tongue swelling. No respiratory distress noted. Patient placed on pulse oximetry on arrival. Saturating well on RA.

## 2020-11-22 NOTE — ED PROVIDER NOTE - ENMT, MLM
Good fetal movement.  No contractions, no vaginal bleeding, and no loss of fluid.  Patient still complaining of vaginal discharge.  Finished flagyl two weeks ago.  Some vaginal irritation, no burning.  White/gray in color.  No odor.  TDAP completed. Affirm sent today.  Will send vaginal treatment if positive for BV.    Airway patent, Nasal mucosa clear. Mouth with swelling of upper lip, lower lip normal. Tongue/posterior pharynx normal. Throat has no vesicles, no oropharyngeal exudates and uvula is midline. No stridor

## 2020-11-26 DIAGNOSIS — T78.3XXA ANGIONEUROTIC EDEMA, INITIAL ENCOUNTER: ICD-10-CM
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
## 2020-11-26 DIAGNOSIS — K13.0 DISEASES OF LIPS: ICD-10-CM

## 2020-11-26 DIAGNOSIS — Z79.52 LONG TERM (CURRENT) USE OF SYSTEMIC STEROIDS: ICD-10-CM

## 2020-11-26 DIAGNOSIS — L50.0 ALLERGIC URTICARIA: ICD-10-CM

## 2020-11-26 DIAGNOSIS — Z88.6 ALLERGY STATUS TO ANALGESIC AGENT: ICD-10-CM
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
## 2020-11-26 DIAGNOSIS — Z88.8 ALLERGY STATUS TO OTHER DRUGS, MEDICAMENTS AND BIOLOGICAL SUBSTANCES: ICD-10-CM

## 2020-11-26 DIAGNOSIS — Z79.1 LONG TERM (CURRENT) USE OF NON-STEROIDAL ANTI-INFLAMMATORIES (NSAID): ICD-10-CM
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
## 2020-11-26 DIAGNOSIS — Z79.899 OTHER LONG TERM (CURRENT) DRUG THERAPY: ICD-10-CM

## 2023-04-05 NOTE — ED ADULT TRIAGE NOTE - DOMESTIC TRAVEL HIGH RISK ALERT 1
Imiquimod Pregnancy And Lactation Text: This medication is Pregnancy Category C. It is unknown if this medication is excreted in breast milk. [place]

## 2024-03-10 NOTE — ED ADULT TRIAGE NOTE - PAIN: PRESENCE, MLM
"INFECTIOUS DISEASE FOLLOW UP NOTE      ASSESSMENT:  Post-operative wound infection. Hardware in place. Fluid collection on MRI subcutaneous, extends to deep structures. Aspirate 3/3 now growing enterobacter. Anaerobe, and staph epi. Deep infection. Now s/p I+D 3/7 -- large subQ fluid collection that tracked down to epidural space. Old hematoma noted. Hardware had covered over with granulation tissue. Growth of staph epi from aspirate of uncertain significance, but this is now confirmed on deep operative cultures, as is Enterobacter. Staph epi raises question of hardware involvement, despite it being covered with granulation tissue as noted at surgery.   S/p L4--L5 transforaminal lumbar interbody fusion on 24.   H/o NHL, maintained on acalabrutinib    PLAN:  Simplify regimen to ertapenem and vancomycin, expect 6 weeks   Then potential oral antibiotic after that. Will ask lab to run susceptibilities on staph epi, including tmp/sulfa.   To stay in hospital into next week, for possible wound closure.   Expect PICC -- single or double lumen depending on 1 or 2 antibiotics    Tereso Tucker MD  [facility]  [phone] [facility]    ______________________________________________________________________    SUBJECTIVE / INTERVAL HISTORY: doing well. Pain controlled. Reviewed results with her and her friend.       ROS: All other systems negative except as listed above.        OBJECTIVE:  /83 (BP Location: Right arm)   Pulse 67   Temp 98.1  F (36.7  C) (Oral)   Resp 15   Ht 1.626 m (5' 4\")   Wt 103 kg (227 lb)   SpO2 97%   BMI 38.96 kg/m         Vital Signs  Temp: 98.4  F (36.9  C)  Temp src: Oral  Resp: 18  Pulse: 68  Pulse Rate Source: Monitor  BP: 136/63  BP Location: Left arm    Temp (24hrs), Av.4  F (36.9  C), Min:98  F (36.7  C), Max:98.7  F (37.1  C)      GEN: No acute distress.    RESPIRATORY:  Normal breathing pattern.   CARDIOVASCULAR:  Regular rate and " rhythm. Normal S1 and S2. No murmur  ABDOMEN:  deferred  EXTREMITIES: No edema.  SKIN/HAIR/NAILS:  No rashes. Wound with vac/irrigation device.   Strength in legs pretty good  IV: peripheral        Antibiotics:  pip/tazo 3/3-  Vancomycin 3/3-4, 3/7-    Pertinent labs:    Recent Labs   Lab 03/09/24  0612 03/08/24  0653 03/06/24  0626 03/05/24  2353 03/04/24  0602   WBC  --  12.6* 9.2  --  10.7   HGB  --  9.2* 9.4*  --  9.6*   HCT  --  28.9* 29.7*  --  30.1*    276 265   < > 195    < > = values in this interval not displayed.        Recent Labs   Lab 03/08/24  0653 03/06/24  0626 03/04/24  0602    138 135   CO2 26 28 22   BUN 13.1 8.6 10.1         Lab Results   Component Value Date    ALT 19 03/03/2024    AST 25 03/03/2024    ALKPHOS 101 03/03/2024         MICROBIOLOGY DATA:  3/3 blood negative  3/3 aspirate enterobacter, staph epi, peptoniphilus  3/7 surgical -- enterobacter, staph epi    RADIOLOGY:  MR Lumbar Spine w/o Contrast    Result Date: 3/3/2024  EXAM: MR LUMBAR SPINE W/O CONTRAST LOCATION: [facility] DATE: 3/3/2024 INDICATION: Lumbar fusion 02/23/2024. Signs and symptoms of infection. Lumbar pain. Drainage at surgical site. COMPARISON: Lumbar radiographs 03/03/2024. Lumbar MRI 11 08/01/2023 TECHNIQUE: Routine Lumbar Spine MRI without IV contrast. FINDINGS: Redemonstration of L4-L5 anterior posterior fusion. Centrally positioned interbody graft and bilateral transpedicular screws with intertransverse rods. 4 mm anterolisthesis of L4 on L5 compared with 5 mm on the preoperative exam. 2.5 mm degenerative anterolisthesis at L5-S1 unchanged. 2.5 mm degenerative retrolisthesis at L2-L3 unchanged. L4-L5 laminotomy. Nomenclature is based on 5 lumbar type vertebral bodies. Normal vertebral body heights. Negative for fracture. Benign osseous hemangioma at the L4 vertebral body. No suspicious marrow signal change. Type II Modic endplate changes at L2-L3 and L3-L4. Normal distal  spinal cord and cauda equina with conus medullaris at L2. Fluid collection in the midline dorsal soft tissues extends from L1-L2 inferiorly to L5-S1 measuring 11 cm cephalocaudad by 6 cm AP by 6 cm mediolateral. A component  of the collection extends into the surgical tract, through the hemilaminotomy and to the dorsal surgical space at L4-L5. This is difficult to evaluate due to susceptibility artifact but the canalicular portion measures approximately 22 x 13 mm transverse extending through the posterior elements. Compression of the right dorsal thecal sac and moderate central canal stenosis is present as seen on axial image 9 of series 6.  Unremarkable visualized bony pelvis. T11-T12: Disc desiccation and ventral osteophyte. Patent central canal and foramen. Mild annular bulge. T12-L1: Disc desiccation , height loss and chronic Schmorl's nodes. Patent central canal and foramen. L1-L2: Disc desiccation. Patent central canal and foramina. There may be a tiny amount of fluid in the dorsal epidural space related to previous surgery. L2-L3: Disc osteophyte complex. Small amount of dorsal canal epidural space fluid may be related to recent surgery. Mild central canal stenosis. Moderate to severe left and mild-to-moderate right foraminal stenosis. L3-L4: Mild disc bulge. Patent central canal. Facet DJD with mild-to-moderate right and mild left foraminal stenosis. L4-L5: Fusion and hemilaminotomy. Moderate central canal stenosis fluid in the dorsal and right dorsal canal appears contiguous with the larger subcutaneous fluid collection and extends to the surgical approach. Signal characteristics are nonspecific but  do not appear particularly complex and this could represent seroma or resolving hematoma. No surrounding inflammatory changes. No definite evidence for discitis or osteomyelitis. Contrast-enhanced imaging may be useful for further evaluation. Mild-to-moderate right and minimal left foraminal stenosis. L5-S1:  Disc desiccation and mild disc bulge. Facet DJD. Moderate right and mild left foraminal stenosis. Patent central canal.     IMPRESSION: 1.  L4-L5 fusion. No hardware complications. Good positioning of transpedicular screws and interbody graft. 2.  Dorsal subcutaneous fluid collection as above which extends through the surgical approach into the dorsal canal epidural space and most prominent at L4-L5. Slight extension cephalad to L1-L2. Consider further evaluation with contrast-enhanced imaging. Fairly homogeneous fluid collection without significant surrounding inflammatory signal changes and may represent seroma or resolving hematoma. Infectious/inflammatory fluid accumulation is possible, however. 3.  Lumbar spondylosis 4.  No fractures. 5.  Findings discussed with Flor James of the neurosurgery service on 3/3/2024 1255 PM CST    XR Lumbar Spine 2/3 Views    Result Date: 3/3/2024  EXAM: XR LUMBAR SPINE 2/3 VIEWS LOCATION: [facility] DATE: 03/03/2024 INDICATION: Lumbar fusion. COMPARISON: 02/26/2024.     IMPRESSION: Five lumbar vertebral bodies. Minimal convex right curvature, unchanged. Dorsal midline skin staples persist. Postoperative changes of interbody and posterior spinal fixation across L4-L5. Grade 1 anterolisthesis of L4 on L5 and L5 on S1, unchanged. Intact hardware. Severe interspace and endplate degeneration L2-L3. Normal vertebral body heights.     XR Lumbar Spine 2 - 3 VWS Portable    Result Date: 2/26/2024  EXAM: XR LUMBAR SPINE PORT 2/3 VIEWS LOCATION: [facility] DATE/TIME: 2/26/2024 4:50 PM CST INDICATION: s p lumbar fusion, upright XR. COMPARISON: None. TECHNIQUE: Radiographs of lumbar spine in routine projections. FINDINGS: Staus post  L4-L5 posterior interbody fusion. No evidence of hardware failure. Alignment similar to prior without change in minor dextrocurvature, grade 1 anterolisthesis at L4-L5 and L5-S1. Normal height of vertebral  bodies. Advanced L2-L3 interbody degeneration similar to prior. Mild multi level facet arthropathy. Unremarkable visualized bony pelvis. Cholecystectomy clips.    XR SURGERY CAROLINE FLUORO GREATER THAN 5 MIN    Result Date: 2/23/2024  This exam was marked as non-reportable because it will not be read by a radiologist or a [facility] non-radiologist provider.     XR Surgery CAROLINE  Fluoro G/T 5 Min    Result Date: 2/23/2024  This exam was marked as non-reportable because it will not be read by a radiologist or a [facility] non-radiologist provider.     Lateral Lumbar Spine for Surgical Imaging  [XR LUMBAR SPINE PORT 1  VIEW]    Result Date: 2/23/2024  EXAM: XR CROSSTABLE LATERAL LUMBAR SPINE PORTABLE LOCATION: [facility] DATE: 2/23/2024 INDICATION: Intra operative COMPARISON: None.     IMPRESSION: Intraoperative crosstable lateral radiograph. Underpenetration of the lumbosacral junction somewhat comprises evaluation. Posterior approach surgical clamp projects over the posterior margin of the L3 posterior spinous process and is directed  at the L4 vertebral body and pedicles.         denies pain/discomfort

## 2024-09-15 ENCOUNTER — EMERGENCY (EMERGENCY)
Facility: HOSPITAL | Age: 54
LOS: 1 days | Discharge: HOME | End: 2024-09-15
Attending: EMERGENCY MEDICINE | Admitting: EMERGENCY MEDICINE
Payer: COMMERCIAL

## 2024-09-15 VITALS
WEIGHT: 175.05 LBS | SYSTOLIC BLOOD PRESSURE: 154 MMHG | DIASTOLIC BLOOD PRESSURE: 96 MMHG | HEART RATE: 66 BPM | TEMPERATURE: 99 F | HEIGHT: 65 IN | RESPIRATION RATE: 21 BRPM | OXYGEN SATURATION: 97 %

## 2024-09-15 VITALS
TEMPERATURE: 98 F | SYSTOLIC BLOOD PRESSURE: 120 MMHG | HEART RATE: 67 BPM | RESPIRATION RATE: 16 BRPM | DIASTOLIC BLOOD PRESSURE: 79 MMHG | OXYGEN SATURATION: 96 %

## 2024-09-15 DIAGNOSIS — I10 ESSENTIAL (PRIMARY) HYPERTENSION: ICD-10-CM

## 2024-09-15 DIAGNOSIS — R61 GENERALIZED HYPERHIDROSIS: ICD-10-CM

## 2024-09-15 DIAGNOSIS — R11.2 NAUSEA WITH VOMITING, UNSPECIFIED: ICD-10-CM

## 2024-09-15 DIAGNOSIS — R10.13 EPIGASTRIC PAIN: ICD-10-CM

## 2024-09-15 DIAGNOSIS — Z88.1 ALLERGY STATUS TO OTHER ANTIBIOTIC AGENTS: ICD-10-CM

## 2024-09-15 DIAGNOSIS — Z88.6 ALLERGY STATUS TO ANALGESIC AGENT: ICD-10-CM

## 2024-09-15 PROBLEM — Z78.9 OTHER SPECIFIED HEALTH STATUS: Chronic | Status: ACTIVE | Noted: 2020-11-22

## 2024-09-15 LAB
ALBUMIN SERPL ELPH-MCNC: 3.8 G/DL — SIGNIFICANT CHANGE UP (ref 3.3–5)
ALBUMIN SERPL ELPH-MCNC: 4.2 G/DL — SIGNIFICANT CHANGE UP (ref 3.3–5)
ALP SERPL-CCNC: 82 U/L — SIGNIFICANT CHANGE UP (ref 40–120)
ALP SERPL-CCNC: 89 U/L — SIGNIFICANT CHANGE UP (ref 40–120)
ALT FLD-CCNC: 24 U/L — SIGNIFICANT CHANGE UP (ref 10–45)
ALT FLD-CCNC: SIGNIFICANT CHANGE UP (ref 10–45)
ANION GAP SERPL CALC-SCNC: 6 MMOL/L — SIGNIFICANT CHANGE UP (ref 5–17)
ANION GAP SERPL CALC-SCNC: 7 MMOL/L — SIGNIFICANT CHANGE UP (ref 5–17)
APPEARANCE UR: CLEAR — SIGNIFICANT CHANGE UP
AST SERPL-CCNC: 21 U/L — SIGNIFICANT CHANGE UP (ref 10–40)
AST SERPL-CCNC: SIGNIFICANT CHANGE UP (ref 10–40)
BASOPHILS # BLD AUTO: 0.05 K/UL — SIGNIFICANT CHANGE UP (ref 0–0.2)
BASOPHILS NFR BLD AUTO: 0.6 % — SIGNIFICANT CHANGE UP (ref 0–2)
BILIRUB SERPL-MCNC: 0.2 MG/DL — SIGNIFICANT CHANGE UP (ref 0.2–1.2)
BILIRUB SERPL-MCNC: 0.2 MG/DL — SIGNIFICANT CHANGE UP (ref 0.2–1.2)
BILIRUB UR-MCNC: NEGATIVE — SIGNIFICANT CHANGE UP
BUN SERPL-MCNC: 16 MG/DL — SIGNIFICANT CHANGE UP (ref 7–23)
BUN SERPL-MCNC: 17 MG/DL — SIGNIFICANT CHANGE UP (ref 7–23)
CALCIUM SERPL-MCNC: 8 MG/DL — LOW (ref 8.4–10.5)
CALCIUM SERPL-MCNC: 8.8 MG/DL — SIGNIFICANT CHANGE UP (ref 8.4–10.5)
CHLORIDE SERPL-SCNC: 103 MMOL/L — SIGNIFICANT CHANGE UP (ref 96–108)
CHLORIDE SERPL-SCNC: 104 MMOL/L — SIGNIFICANT CHANGE UP (ref 96–108)
CO2 SERPL-SCNC: 23 MMOL/L — SIGNIFICANT CHANGE UP (ref 22–31)
CO2 SERPL-SCNC: 25 MMOL/L — SIGNIFICANT CHANGE UP (ref 22–31)
COLOR SPEC: YELLOW — SIGNIFICANT CHANGE UP
CREAT SERPL-MCNC: 0.96 MG/DL — SIGNIFICANT CHANGE UP (ref 0.5–1.3)
CREAT SERPL-MCNC: 1.18 MG/DL — SIGNIFICANT CHANGE UP (ref 0.5–1.3)
DIFF PNL FLD: NEGATIVE — SIGNIFICANT CHANGE UP
EGFR: 74 ML/MIN/1.73M2 — SIGNIFICANT CHANGE UP
EGFR: 95 ML/MIN/1.73M2 — SIGNIFICANT CHANGE UP
EOSINOPHIL # BLD AUTO: 0.03 K/UL — SIGNIFICANT CHANGE UP (ref 0–0.5)
EOSINOPHIL NFR BLD AUTO: 0.4 % — SIGNIFICANT CHANGE UP (ref 0–6)
GLUCOSE SERPL-MCNC: 169 MG/DL — HIGH (ref 70–99)
GLUCOSE SERPL-MCNC: 179 MG/DL — HIGH (ref 70–99)
GLUCOSE UR QL: NEGATIVE MG/DL — SIGNIFICANT CHANGE UP
HCT VFR BLD CALC: 47.3 % — SIGNIFICANT CHANGE UP (ref 39–50)
HGB BLD-MCNC: 15.6 G/DL — SIGNIFICANT CHANGE UP (ref 13–17)
IMM GRANULOCYTES NFR BLD AUTO: 0.5 % — SIGNIFICANT CHANGE UP (ref 0–0.9)
KETONES UR-MCNC: NEGATIVE MG/DL — SIGNIFICANT CHANGE UP
LACTATE SERPL-SCNC: 0.9 MMOL/L — SIGNIFICANT CHANGE UP (ref 0.5–2)
LEUKOCYTE ESTERASE UR-ACNC: NEGATIVE — SIGNIFICANT CHANGE UP
LIDOCAIN IGE QN: 68 U/L — HIGH (ref 7–60)
LYMPHOCYTES # BLD AUTO: 1.38 K/UL — SIGNIFICANT CHANGE UP (ref 1–3.3)
LYMPHOCYTES # BLD AUTO: 17.1 % — SIGNIFICANT CHANGE UP (ref 13–44)
MCHC RBC-ENTMCNC: 29.3 PG — SIGNIFICANT CHANGE UP (ref 27–34)
MCHC RBC-ENTMCNC: 33 GM/DL — SIGNIFICANT CHANGE UP (ref 32–36)
MCV RBC AUTO: 88.7 FL — SIGNIFICANT CHANGE UP (ref 80–100)
MONOCYTES # BLD AUTO: 0.63 K/UL — SIGNIFICANT CHANGE UP (ref 0–0.9)
MONOCYTES NFR BLD AUTO: 7.8 % — SIGNIFICANT CHANGE UP (ref 2–14)
NEUTROPHILS # BLD AUTO: 5.94 K/UL — SIGNIFICANT CHANGE UP (ref 1.8–7.4)
NEUTROPHILS NFR BLD AUTO: 73.6 % — SIGNIFICANT CHANGE UP (ref 43–77)
NITRITE UR-MCNC: NEGATIVE — SIGNIFICANT CHANGE UP
NRBC # BLD: 0 /100 WBCS — SIGNIFICANT CHANGE UP (ref 0–0)
PH UR: 5.5 — SIGNIFICANT CHANGE UP (ref 5–8)
PLATELET # BLD AUTO: 232 K/UL — SIGNIFICANT CHANGE UP (ref 150–400)
POTASSIUM SERPL-MCNC: 4.1 MMOL/L — SIGNIFICANT CHANGE UP (ref 3.5–5.3)
POTASSIUM SERPL-MCNC: SIGNIFICANT CHANGE UP (ref 3.5–5.3)
POTASSIUM SERPL-SCNC: 4.1 MMOL/L — SIGNIFICANT CHANGE UP (ref 3.5–5.3)
POTASSIUM SERPL-SCNC: SIGNIFICANT CHANGE UP (ref 3.5–5.3)
PROT SERPL-MCNC: 6.7 G/DL — SIGNIFICANT CHANGE UP (ref 6–8.3)
PROT SERPL-MCNC: 7.4 G/DL — SIGNIFICANT CHANGE UP (ref 6–8.3)
PROT UR-MCNC: NEGATIVE MG/DL — SIGNIFICANT CHANGE UP
RBC # BLD: 5.33 M/UL — SIGNIFICANT CHANGE UP (ref 4.2–5.8)
RBC # FLD: 13 % — SIGNIFICANT CHANGE UP (ref 10.3–14.5)
SODIUM SERPL-SCNC: 132 MMOL/L — LOW (ref 135–145)
SODIUM SERPL-SCNC: 136 MMOL/L — SIGNIFICANT CHANGE UP (ref 135–145)
SP GR SPEC: 1.02 — SIGNIFICANT CHANGE UP (ref 1–1.03)
UROBILINOGEN FLD QL: 0.2 MG/DL — SIGNIFICANT CHANGE UP (ref 0.2–1)
WBC # BLD: 8.07 K/UL — SIGNIFICANT CHANGE UP (ref 3.8–10.5)
WBC # FLD AUTO: 8.07 K/UL — SIGNIFICANT CHANGE UP (ref 3.8–10.5)

## 2024-09-15 PROCEDURE — 85025 COMPLETE CBC W/AUTO DIFF WBC: CPT

## 2024-09-15 PROCEDURE — 74022 RADEX COMPL AQT ABD SERIES: CPT

## 2024-09-15 PROCEDURE — 96375 TX/PRO/DX INJ NEW DRUG ADDON: CPT

## 2024-09-15 PROCEDURE — 99285 EMERGENCY DEPT VISIT HI MDM: CPT | Mod: 25

## 2024-09-15 PROCEDURE — 74177 CT ABD & PELVIS W/CONTRAST: CPT | Mod: 26,MC

## 2024-09-15 PROCEDURE — 74177 CT ABD & PELVIS W/CONTRAST: CPT | Mod: MC

## 2024-09-15 PROCEDURE — 93010 ELECTROCARDIOGRAM REPORT: CPT

## 2024-09-15 PROCEDURE — 81003 URINALYSIS AUTO W/O SCOPE: CPT

## 2024-09-15 PROCEDURE — 96374 THER/PROPH/DIAG INJ IV PUSH: CPT | Mod: XU

## 2024-09-15 PROCEDURE — 83605 ASSAY OF LACTIC ACID: CPT

## 2024-09-15 PROCEDURE — 99285 EMERGENCY DEPT VISIT HI MDM: CPT

## 2024-09-15 PROCEDURE — 36415 COLL VENOUS BLD VENIPUNCTURE: CPT

## 2024-09-15 PROCEDURE — 80053 COMPREHEN METABOLIC PANEL: CPT

## 2024-09-15 PROCEDURE — 83690 ASSAY OF LIPASE: CPT

## 2024-09-15 PROCEDURE — 74022 RADEX COMPL AQT ABD SERIES: CPT | Mod: 26

## 2024-09-15 PROCEDURE — 93005 ELECTROCARDIOGRAM TRACING: CPT

## 2024-09-15 PROCEDURE — 87086 URINE CULTURE/COLONY COUNT: CPT

## 2024-09-15 RX ORDER — SODIUM CHLORIDE 9 MG/ML
1000 INJECTION INTRAMUSCULAR; INTRAVENOUS; SUBCUTANEOUS ONCE
Refills: 0 | Status: COMPLETED | OUTPATIENT
Start: 2024-09-15 | End: 2024-09-15

## 2024-09-15 RX ORDER — ONDANSETRON 2 MG/ML
4 INJECTION, SOLUTION INTRAMUSCULAR; INTRAVENOUS ONCE
Refills: 0 | Status: COMPLETED | OUTPATIENT
Start: 2024-09-15 | End: 2024-09-15

## 2024-09-15 RX ORDER — PANTOPRAZOLE SODIUM 40 MG
40 TABLET, DELAYED RELEASE (ENTERIC COATED) ORAL ONCE
Refills: 0 | Status: COMPLETED | OUTPATIENT
Start: 2024-09-15 | End: 2024-09-15

## 2024-09-15 RX ORDER — IOHEXOL 350 MG/ML
30 INJECTION, SOLUTION INTRAVENOUS ONCE
Refills: 0 | Status: COMPLETED | OUTPATIENT
Start: 2024-09-15 | End: 2024-09-15

## 2024-09-15 RX ADMIN — Medication 40 MILLIGRAM(S): at 04:52

## 2024-09-15 RX ADMIN — SODIUM CHLORIDE 1000 MILLILITER(S): 9 INJECTION INTRAMUSCULAR; INTRAVENOUS; SUBCUTANEOUS at 05:43

## 2024-09-15 RX ADMIN — ONDANSETRON 4 MILLIGRAM(S): 2 INJECTION, SOLUTION INTRAMUSCULAR; INTRAVENOUS at 03:54

## 2024-09-15 RX ADMIN — Medication 4 MILLIGRAM(S): at 03:54

## 2024-09-15 RX ADMIN — IOHEXOL 30 MILLILITER(S): 350 INJECTION, SOLUTION INTRAVENOUS at 03:55

## 2024-09-15 RX ADMIN — SODIUM CHLORIDE 1000 MILLILITER(S): 9 INJECTION INTRAMUSCULAR; INTRAVENOUS; SUBCUTANEOUS at 03:55

## 2024-09-15 NOTE — ED ADULT NURSE NOTE - NSFALLUNIVINTERV_ED_ALL_ED
Bed/Stretcher in lowest position, wheels locked, appropriate side rails in place/Call bell, personal items and telephone in reach/Instruct patient to call for assistance before getting out of bed/chair/stretcher/Non-slip footwear applied when patient is off stretcher/[place] to call system/Physically safe environment - no spills, clutter or unnecessary equipment/Purposeful proactive rounding/Room/bathroom lighting operational, light cord in reach

## 2024-09-15 NOTE — ED PROVIDER NOTE - PHYSICAL EXAMINATION
no LE edema, normal equal distal pulses, steady unassisted gait.   abd: diffuse ttp w/ mild guarding, no rebound. No CVAT

## 2024-09-15 NOTE — ED PROVIDER NOTE - RELIEVING FACTORS
Spoke with patient's daughter ( [place] [phone]). Colonoscopy/Lower EUS scheduled for 2/26 at 11. Endoscopy scheduling nurse will contact patient's daughter with prep.  
none

## 2024-09-15 NOTE — ED PROVIDER NOTE - IV ALTEPLASE DOOR HIDDEN
HPI


Chief Complaint:  Cold / Flu Symptoms


Time Seen by Provider:  06:36


Travel History


International Travel<30 days:  No


Contact w/Intl Traveler<30days:  No


Traveled to known affect area:  No





History of Present Illness


HPI


20-year-old male presents to the emergency department for complaint of 5 days 

of productive cough with wheezing.  Patient states he is used over-the-counter 

medications without symptomatically relief.  Patient had yellow sputum 

production.  Coworkers are ill.  Patient states that his cough so much his 

abdominal muscles are sore.  Patient denies previous history of respiratory 

illness other than the flu and December.  Patient does smoke electronic 

cigarette.





PFS


Past Medical History


*** Narrative Medical


Negative past medical history negative surgical history electronic tobacco use; 

nursing notes reviewed


Medical History:  Denies Significant Hx


Diminished Hearing:  No


Immunizations Current:  Yes


Tetanus Vaccination:  > 5 Years


Influenza Vaccination:  No





Past Surgical History


Surgical History:  No Previous Surgery





Social History


Alcohol Use:  Yes (occ)


Tobacco Use:  No (vapes)


Substance Use:  No





Allergies-Medications


(Allergen,Severity, Reaction):  


Coded Allergies:  


     No Known Allergies (Verified  Adverse Reaction, Unknown, 5/26/18)


Reported Meds & Prescriptions





Reported Meds & Active Scripts


Active


Proair Hfa 8.5 GM Inh (Albuterol Sulfate) 90 Mcg/Act Aer 2 Puff INH Q4-6H PRN


     108 mcg/actuation


Medrol Dosepak (Methylprednisolone) 4 Mg Dspk 4 Mg PO AS DIRECTED


     Per Pharmacist direction


Zithromax Z-Marcin (Azithromycin) 250 Mg Dspk 250 Mg PO AS DIRECTED


     500 MG (2 tabs) day 1, then 1 tab days 2-5.





Review of Systems


Except as stated in HPI:  all other systems reviewed are Neg


General / Constitutional:  No: Fever, Chills


HENT:  Positive: Sore Throat, Congestion


Cardiovascular:  No: Chest Pain or Discomfort


Respiratory:  Positive: Cough, Wheezing


Gastrointestinal:  No: Vomiting, Abdominal Pain


Genitourinary:  No: Flank Pain


Musculoskeletal:  No: Myalgias, Arthralgias


Skin:  No Rash


Neurologic:  No: Weakness


Psychiatric:  No: Anxiety


Hematologic/Lymphatic:  No: Lymph Node Enlargement





Physical Exam


Narrative


GENERAL: Well-developed well-nourished male no acute distress no respiratory 

distress; intermittent coughing


SKIN: Warm and dry.


HEAD: Normocephalic.


EYES: No scleral icterus. No injection or drainage.  ENT: Mucous membranes 

moist airways patent no pharyngeal edema erythema or exudative change


NECK: Supple, trachea midline. No JVD or lymphadenopathy.


CARDIOVASCULAR: Regular rate and rhythm without murmurs, gallops, or rubs. 


RESPIRATORY: Breath sounds equal bilaterally. No accessory muscle use.


GASTROINTESTINAL: Abdomen soft, non-tender, nondistended. 


MUSCULOSKELETAL: No cyanosis, or edema. 


BACK: Nontender without obvious deformity. No CVA tenderness.








Data


Data


Last Documented VS





Vital Signs








  Date Time  Temp Pulse Resp B/P (MAP) Pulse Ox O2 Delivery O2 Flow Rate FiO2


 


5/26/18 06:16 97.8 71  119/65 (83) 98   








Orders





 Orders


Ed Discharge Order (5/26/18 06:36)








[facility]


Medical Decision Making


Medical Screen Exam Complete:  Yes


Emergency Medical Condition:  Yes


Medical Record Reviewed:  Yes


Differential Diagnosis


Cough viral syndrome bronchitis sinusitis number


Narrative Course


Patient with cough congestion sputum production symptoms persistent 5 days 

with worsening symptoms patient has had subjective fever without chills.  

Patient will be started on oral antibiotics steroid therapy and albuterol 

inhaler.  Patient is encouraged to discontinue tobacco use/electronic cigarette 

use.  Patient will be given 1 day off and is encouraged to increase fluid 

hydration and monitor temperature for fever and take as needed acetaminophen or 

ibuprofen.  Patient is encouraged to follow-up with his primary care provider


Patient is encouraged to return to the emergency department for any concerns or 

change in condition





Diagnosis





 Primary Impression:  


 Bronchitis


Referrals:  


Primary Care Physician


call for appointment


Patient Instructions:  General Instructions


Departure Forms:  Tests/Procedures, Work Release   


   Special Instructions:  no work x 1 day





***Additional Instructions:  


Increase fluid hydration


Take medication as prescribed


Take acetaminophen as needed for fever 100.4F or greater


Take ibuprofen as needed for fever 100.4F or greater for pain associated with 

inflammation; avoid use while taking steroid taper


No work 1


Return to the emergency department for any concerns or change in


Follow-up with your primary care provider


***Med/Other Pt SpecificInfo:  Prescription(s) given


Scripts


Albuterol 8.5 GM Inh (Proair Hfa 8.5 GM Inh) 90 Mcg/Act Aer


2 PUFF INH Q4-6H Y for SHORTNESS OF BREATH, #1 INHALER 0 Refills


   108 mcg/actuation


   Prov: Lis Cope MD         5/26/18 


Methylprednisolone Dosepak (Medrol Dosepak) 4 Mg Dspk


4 MG PO AS DIRECTED, #1 DSPK 0 Refills


   Per Pharmacist direction


   Prov: Lis Cope MD         5/26/18 


Azithromycin (Zithromax Z-Marcin) 250 Mg Dspk


250 MG PO AS DIRECTED for Infection, #1 DSPK 0 Refills


   500 MG (2 tabs) day 1, then 1 tab days 2-5.


   Prov: Lis Cope MD         5/26/18


Disposition:  01 DISCHARGE HOME


Condition:  Stable











Lis Cope MD May 26, 2018 06:38
show

## 2024-09-15 NOTE — ED PROVIDER NOTE - OBJECTIVE STATEMENT
53M denies PMH p/w epigastric pain, began several hrs ago after going fishing and drinking several beers. +NBNB NV. +cold sweats. No other systemic symptoms.   States she rarely drinks etoh.   Denies fevers, diarrhea, black stool, bloody stool, dysuria, hematuria, urinary frequency, focal weakness, focal numbness, lightheadedness, back pain, SOB, CP, rhinorrhea, nasal congestion, sore throat, cough, testicular pain, penile pain.

## 2024-09-15 NOTE — ED PROVIDER NOTE - PROGRESS NOTE DETAILS
Klepfish: Initial CMP hemolyzed. Rpt w/ Na 132, Ca 8, lipase 68, other labs grossly wnl. UA no infection. CT pending, will reassess. [place]: CT prelim w/ no acute pathology. Feeling much better, tolerating PO. Abd soft NTND. Discussed importance of outpt follow up and return precautions. Clinically no indication for further emergent ED workup or hospitalization at this time. Stable for dc, outpt f/u.

## 2024-09-15 NOTE — ED ADULT NURSE NOTE - OBJECTIVE STATEMENT
diffused abdominal pain since 11pm last night.  Pt states had the same pain last Thursday, but pain went away.  Pt states he went fishing w/ his friend yesterday & had 4 Christina Lite & ate some "chicharones"

## 2024-09-15 NOTE — ED PROVIDER NOTE - PATIENT PORTAL LINK FT
You can access the FollowMyHealth Patient Portal offered by [facility] by registering at the following website: [email or web address]. By joining [facility]’s FollowMyHealth portal, you will also be able to view your health information using other applications (apps) compatible with our system.

## 2024-09-15 NOTE — ED PROVIDER NOTE - CLINICAL SUMMARY MEDICAL DECISION MAKING FREE TEXT BOX
53M denies PMH p/w epigastric pain, began several hrs ago after going fishing and drinking several beers. +NBNB NV. +cold sweats. No other systemic symptoms.   Mild HTN, other vitals wnl. Exam as above.   ddx: Possible pancreatitis vs. jeff vs. colitis vs. SBO vs. lower suspicion perf.  Labs, CT, IVF/attempt symptom control, reassess.

## 2024-09-15 NOTE — ED ADULT TRIAGE NOTE - CHIEF COMPLAINT QUOTE
"My stomach has been hurting all night. I went fishing today, I had a couple beers and some food yesterday morning. Afterwards I started having the pain. I had something similar on Thursday. I drank some lemon juice and it got better."

## 2024-09-16 LAB
CULTURE RESULTS: NO GROWTH — SIGNIFICANT CHANGE UP
SPECIMEN SOURCE: SIGNIFICANT CHANGE UP

## 2024-10-24 PROBLEM — Z00.00 ENCOUNTER FOR PREVENTIVE HEALTH EXAMINATION: Status: ACTIVE | Noted: 2024-10-24

## 2024-10-29 ENCOUNTER — APPOINTMENT (OUTPATIENT)
Dept: PULMONOLOGY | Facility: CLINIC | Age: 54
End: 2024-10-29
Payer: OTHER GOVERNMENT

## 2024-10-29 ENCOUNTER — NON-APPOINTMENT (OUTPATIENT)
Age: 54
End: 2024-10-29

## 2024-10-29 VITALS
WEIGHT: 186 LBS | RESPIRATION RATE: 12 BRPM | HEIGHT: 65 IN | TEMPERATURE: 97.9 F | OXYGEN SATURATION: 98 % | BODY MASS INDEX: 30.99 KG/M2 | HEART RATE: 91 BPM | SYSTOLIC BLOOD PRESSURE: 132 MMHG | DIASTOLIC BLOOD PRESSURE: 50 MMHG

## 2024-10-29 DIAGNOSIS — R06.02 SHORTNESS OF BREATH: ICD-10-CM

## 2024-10-29 DIAGNOSIS — Z72.0 TOBACCO USE: ICD-10-CM

## 2024-10-29 DIAGNOSIS — G47.30 SLEEP APNEA, UNSPECIFIED: ICD-10-CM

## 2024-10-29 PROCEDURE — 99406 BEHAV CHNG SMOKING 3-10 MIN: CPT

## 2024-10-29 PROCEDURE — 99204 OFFICE O/P NEW MOD 45 MIN: CPT

## 2024-10-29 RX ORDER — VARENICLINE TARTRATE 0.5 (11)-1
0.5 MG X 11 & KIT ORAL DAILY
Qty: 53 | Refills: 0 | Status: ACTIVE | COMMUNITY
Start: 2024-10-29 | End: 1900-01-01

## 2024-11-20 NOTE — ED ADULT NURSE NOTE - HIV OFFER
Can the newest prescription be sent to [facility]? [facility] until first dose comes from [facility]. Thanks  
Impression:  Internal hemorrhoids  Otherwise normal colonoscopy to the cecum without precancerous polyps               Recommendations  High fiber diet, avoid constipation  Follow up colonoscopy in 10 years, for normal screening.  
Opt out

## 2024-12-11 ENCOUNTER — APPOINTMENT (OUTPATIENT)
Dept: PULMONOLOGY | Facility: CLINIC | Age: 54
End: 2024-12-11

## 2025-09-16 ENCOUNTER — EMERGENCY (EMERGENCY)
Facility: HOSPITAL | Age: 55
LOS: 1 days | End: 2025-09-16
Admitting: EMERGENCY MEDICINE

## 2025-09-16 VITALS
WEIGHT: 134.92 LBS | TEMPERATURE: 98 F | SYSTOLIC BLOOD PRESSURE: 126 MMHG | RESPIRATION RATE: 18 BRPM | OXYGEN SATURATION: 98 % | HEART RATE: 69 BPM | HEIGHT: 65 IN | DIASTOLIC BLOOD PRESSURE: 80 MMHG
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
## 2025-09-16 DIAGNOSIS — H57.11 OCULAR PAIN, RIGHT EYE: ICD-10-CM

## 2025-09-16 DIAGNOSIS — S05.01XA INJURY OF CONJUNCTIVA AND CORNEAL ABRASION WITHOUT FOREIGN BODY, RIGHT EYE, INITIAL ENCOUNTER: ICD-10-CM
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
## 2025-09-16 DIAGNOSIS — Z88.6 ALLERGY STATUS TO ANALGESIC AGENT: ICD-10-CM

## 2025-09-16 DIAGNOSIS — Z88.1 ALLERGY STATUS TO OTHER ANTIBIOTIC AGENTS: ICD-10-CM
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
## 2025-09-16 PROCEDURE — 99284 EMERGENCY DEPT VISIT MOD MDM: CPT

## 2025-09-16 RX ORDER — POLYMYXIN B SULFATE AND TRIMETHOPRIM SULFATE 10000; 1 [USP'U]/ML; MG/ML
1 SOLUTION/ DROPS OPHTHALMIC
Qty: 1 | Refills: 0
Start: 2025-09-16 | End: 2025-09-20

## 2025-09-16 RX ORDER — OFLOXACIN 3 MG/ML
1 SOLUTION OPHTHALMIC
Refills: 0
Start: 2025-09-16